# Patient Record
Sex: MALE | Race: WHITE
[De-identification: names, ages, dates, MRNs, and addresses within clinical notes are randomized per-mention and may not be internally consistent; named-entity substitution may affect disease eponyms.]

---

## 2017-05-15 ENCOUNTER — HOSPITAL ENCOUNTER (OUTPATIENT)
Dept: HOSPITAL 39 - GMAJ | Age: 57
Discharge: HOME | End: 2017-05-15
Attending: FAMILY MEDICINE
Payer: COMMERCIAL

## 2017-05-15 DIAGNOSIS — M10.9: Primary | ICD-10-CM

## 2017-06-05 ENCOUNTER — HOSPITAL ENCOUNTER (OUTPATIENT)
Dept: HOSPITAL 39 - MRI | Age: 57
Discharge: HOME | End: 2017-06-05
Attending: FAMILY MEDICINE
Payer: COMMERCIAL

## 2017-06-05 DIAGNOSIS — S83.242A: Primary | ICD-10-CM

## 2017-06-05 NOTE — MRI
MRI left knee without contrast



INDICATION: Knee pain baseball injury left months ago medial and

posterior pain



TECHNIQUE: Noncontrast MR imaging left knee standard protocol



FINDINGS:



Normal patellofemoral alignment. Fairly large joint effusion with

mild synovitis.



Minimal extensor tendinosis without rupture or retraction.



Slight interstitial increased signal within the ACL extending to

the tibial attachment indicating a low-grade prior interstitial

injury. Similar changes in the PCL. No rupture.



There is diffuse up to grade 4 chondrosis in the medial

tibiofemoral compartment with joint space narrowing and

multifocal subchondral edema. There is also a diffuse ill-defined

relatively horizontal tear of the body and posterior horn medial

meniscus. The posterior horn centrally is macerated and small to

the meniscal root region.



Joint space narrowing is noted indicating osteoarthrosis with

subchondral sclerosis in addition to the edema. There is partial

medial extrusion of the medial meniscus on the coronal images.



Mild grade 2 chondrosis is noted in the lateral tibiofemoral

compartment.



Collateral ligaments are intact although the MCL slightly lax

related to the joint space narrowing.



Prominent enthesophyte upper pole patella related to the

quadricep attachment







IMPRESSION:



Diffuse chronic-appearing medial meniscal tear with medial

extrusion and advanced chondrosis with developing osteoarthrosis

medial tibiofemoral compartment



Lower grade chondrosis in the remainder of the knee



No nando disruption of the stabilizing ligaments 



Fairly large joint effusion



Electronically signed by:  Erlin Bennett MD  6/5/2017 10:25 AM

CDT Workstation: [id number]

## 2018-04-09 ENCOUNTER — HOSPITAL ENCOUNTER (OUTPATIENT)
Dept: HOSPITAL 39 - GMAJ | Age: 58
Discharge: HOME | End: 2018-04-09
Attending: FAMILY MEDICINE
Payer: COMMERCIAL

## 2018-04-09 DIAGNOSIS — E78.2: ICD-10-CM

## 2018-04-09 DIAGNOSIS — Z00.00: Primary | ICD-10-CM

## 2018-04-09 DIAGNOSIS — M10.9: ICD-10-CM

## 2019-04-10 ENCOUNTER — HOSPITAL ENCOUNTER (OUTPATIENT)
Dept: HOSPITAL 39 - AMB | Age: 59
Discharge: HOME | End: 2019-04-10
Attending: INTERNAL MEDICINE
Payer: COMMERCIAL

## 2019-04-10 VITALS — SYSTOLIC BLOOD PRESSURE: 92 MMHG | DIASTOLIC BLOOD PRESSURE: 55 MMHG | OXYGEN SATURATION: 95 % | TEMPERATURE: 98.3 F

## 2019-04-10 DIAGNOSIS — D12.3: ICD-10-CM

## 2019-04-10 DIAGNOSIS — D12.0: ICD-10-CM

## 2019-04-10 DIAGNOSIS — M10.9: ICD-10-CM

## 2019-04-10 DIAGNOSIS — Z12.11: Primary | ICD-10-CM

## 2019-04-10 DIAGNOSIS — Z86.010: ICD-10-CM

## 2019-04-10 DIAGNOSIS — K57.30: ICD-10-CM

## 2019-04-10 DIAGNOSIS — Z87.19: ICD-10-CM

## 2019-04-10 DIAGNOSIS — Z79.899: ICD-10-CM

## 2019-04-10 DIAGNOSIS — Z88.8: ICD-10-CM

## 2019-04-10 PROCEDURE — 45385 COLONOSCOPY W/LESION REMOVAL: CPT

## 2019-04-10 PROCEDURE — 00812 ANES LWR INTST SCR COLSC: CPT

## 2019-04-10 NOTE — OP
DATE OF PROCEDURE:  04/10/19



PREPROCEDURE DIAGNOSIS: 

1.  History of colonic polyps.

2.  Surveillance colonoscopy.



POSTPROCEDURE DIAGNOSIS: 

1.  Colonic polyps.

2.  Diverticulosis.



PROCEDURE: 

1.  Colonoscopy.



SURGEON: Blanco Richardson MD



COMPLICATIONS:  No immediate complications.



SEDATION:  The patient was sedated via IV propofol by the Anesthesia Department.



CONSENT:  Prior to the procedure, risks, benefits and alternatives to the 
therapy were discussed with the patient.  The risks included bleeding, 
infection, perforation and death.  The patient agreed to the procedure and 
signed a consent.  



PREPROCEDURE ANESTHESIA ASSESSMENT:  Mallampati class type 2, ASA grade 
assessment type 2.  Throughout the procedure, the patient's vital signs were 
closely monitored.  



PROCEDURE:  The patient was placed in the left lateral decubitus position and a 
rectal examination was performed.  The rectal examination was within normal 
limits.  The Olympus colonoscope was passed in the anus all the way into the 
cecum as identified by the ileocecal valve and appendiceal orifice.  The scope 
was retracted and the mucosa was visualized.  The entirety of the exam was 
performed under direct visualization.  Retroflexion was performed in the rectum.
 Preparation quality was excellent . The withdrawal time was greater than 6 
minutes.  The patient tolerated the procedure well.  



FINDINGS:  

1.  Small number of scattered diverticula were found in the sigmoid colon.

2.  Two sessile colonic polyps were found in the right colon.  One 5 mm polyp in
the 

     cecum and one 8 mm polyp in the proximal transverse colon.  Both polyps 
were

     resected with a cold snare and complete retrieved.  No bleeding during or 
at the

     end of the procedure.  



RECOMMENDATION:  

1.  Return the patient home.

2.  Resume previous diet favoring high-fiber foods.  

3.  Followup pathology results.  

4.  Repeat colonoscopy in the next 5 years.

5.  Return to referring physicians office as previously scheduled.  

6.  Findings were discussed with the patient and family members.



#[id number]

[facility]